# Patient Record
Sex: MALE | URBAN - METROPOLITAN AREA
[De-identification: names, ages, dates, MRNs, and addresses within clinical notes are randomized per-mention and may not be internally consistent; named-entity substitution may affect disease eponyms.]

---

## 2022-06-28 ENCOUNTER — TELEPHONE ENCOUNTER (OUTPATIENT)
Dept: URBAN - METROPOLITAN AREA CLINIC 78 | Facility: CLINIC | Age: 42
End: 2022-06-28

## 2022-06-28 NOTE — PREVIOUS WORKUP REVIEWED
.  ENDOSCOPIES   LABS -Labs 6/16/2022: WBC 3.7, platelet 304, hemoglobin 10.6, hemoglobin A1c 5.4, BUN 13, creatinine 0.96, total bilirubin 0.3, alkaline phosphatase 71, AST 20, ALT 21, total protein 7.1, albumin 4.6.  IMAGES

## 2022-06-28 NOTE — HPI-TODAY'S VISIT:
42-year-old male is self-referred for anemia.  Feeling dizzy intermittently.  He has had rectal bleeding for the past few years.  Blaming hemorrhoids.  Sx of prolapsing. Last EGD and colonoscopy 4 years ago.  Unremarkable.  Hemorrhoids was noted.  Was recommended to do sitz bath as needed.  He plays once every 2-3 months, lasting a few days.  Some lower abdominal cramping with the bleeding otherwise no abdominal pain.  Intentional weight loss of 6 pounds.  Denies nausea vomiting.  Frequent heartburn, treated with over-the-counter PPI as needed only.  Denies constipation.  Moves bowel 1-2 daily.  Denies frequent NSAID use.  No family history of colon cancer or gastric cancer.

## 2022-06-29 ENCOUNTER — DASHBOARD ENCOUNTERS (OUTPATIENT)
Age: 42
End: 2022-06-29

## 2022-06-29 ENCOUNTER — TELEPHONE ENCOUNTER (OUTPATIENT)
Dept: URBAN - METROPOLITAN AREA CLINIC 78 | Facility: CLINIC | Age: 42
End: 2022-06-29

## 2022-07-01 PROBLEM — 724556004: Status: ACTIVE | Noted: 2022-06-29

## 2022-07-06 ENCOUNTER — TELEPHONE ENCOUNTER (OUTPATIENT)
Dept: URBAN - METROPOLITAN AREA SURGERY CENTER 8 | Facility: SURGERY CENTER | Age: 42
End: 2022-07-06

## 2022-07-12 ENCOUNTER — CLAIMS CREATED FROM THE CLAIM WINDOW (OUTPATIENT)
Dept: URBAN - METROPOLITAN AREA CLINIC 4 | Facility: CLINIC | Age: 42
End: 2022-07-12
Payer: SELF-PAY

## 2022-07-12 ENCOUNTER — OFFICE VISIT (OUTPATIENT)
Dept: URBAN - METROPOLITAN AREA SURGERY CENTER 8 | Facility: SURGERY CENTER | Age: 42
End: 2022-07-12
Payer: SELF-PAY

## 2022-07-12 DIAGNOSIS — K31.89 OTHER DISEASES OF STOMACH AND DUODENUM: ICD-10-CM

## 2022-07-12 DIAGNOSIS — D50.9 ANEMIA: ICD-10-CM

## 2022-07-12 DIAGNOSIS — K31.89 ACQUIRED DEFORMITY OF DUODENUM: ICD-10-CM

## 2022-07-12 DIAGNOSIS — K63.89 BACTERIAL OVERGROWTH SYNDROME: ICD-10-CM

## 2022-07-12 DIAGNOSIS — K21.9 ACID REFLUX: ICD-10-CM

## 2022-07-12 DIAGNOSIS — K63.89 OTHER SPECIFIED DISEASES OF INTESTINE: ICD-10-CM

## 2022-07-12 DIAGNOSIS — K21.9 GASTRO-ESOPHAGEAL REFLUX DISEASE WITHOUT ESOPHAGITIS: ICD-10-CM

## 2022-07-12 PROCEDURE — 43239 EGD BIOPSY SINGLE/MULTIPLE: CPT | Performed by: INTERNAL MEDICINE

## 2022-07-12 PROCEDURE — 45378 DIAGNOSTIC COLONOSCOPY: CPT | Performed by: INTERNAL MEDICINE

## 2022-07-12 PROCEDURE — G8907 PT DOC NO EVENTS ON DISCHARG: HCPCS | Performed by: INTERNAL MEDICINE

## 2022-07-12 PROCEDURE — 88342 IMHCHEM/IMCYTCHM 1ST ANTB: CPT | Performed by: PATHOLOGY

## 2022-07-12 PROCEDURE — 88313 SPECIAL STAINS GROUP 2: CPT | Performed by: PATHOLOGY

## 2022-07-12 PROCEDURE — 88305 TISSUE EXAM BY PATHOLOGIST: CPT | Performed by: PATHOLOGY

## 2022-07-12 PROCEDURE — 88312 SPECIAL STAINS GROUP 1: CPT | Performed by: PATHOLOGY

## 2023-03-14 ENCOUNTER — WEB ENCOUNTER (OUTPATIENT)
Age: 43
End: 2023-03-14

## 2023-03-14 RX ORDER — TAMSULOSIN HYDROCHLORIDE 0.4 MG/1
1 CAPSULE CAPSULE ORAL ONCE A DAY
Qty: 90 CAPSULE | Refills: 1 | OUTPATIENT
Start: 2023-03-14

## 2023-08-13 ENCOUNTER — TELEPHONE ENCOUNTER (OUTPATIENT)
Dept: URBAN - METROPOLITAN AREA CLINIC 78 | Facility: CLINIC | Age: 43
End: 2023-08-13

## 2023-08-13 RX ORDER — FINASTERIDE 5 MG/1
1 TABLET TABLET, FILM COATED ORAL ONCE A DAY
Qty: 90 TABLET | Refills: 1 | OUTPATIENT
Start: 2023-08-13 | End: 2024-02-08

## 2023-08-13 RX ORDER — TAMSULOSIN HYDROCHLORIDE 0.4 MG/1
1 CAPSULE CAPSULE ORAL ONCE A DAY
Qty: 90 CAPSULE | Refills: 1
Start: 2023-03-14 | End: 2024-02-09

## 2023-11-23 ENCOUNTER — WEB ENCOUNTER (OUTPATIENT)
Age: 43
End: 2023-11-23

## 2023-11-23 RX ORDER — VALACYCLOVIR HYDROCHLORIDE 1 G/1
1 TABLET TABLET, FILM COATED ORAL THREE TIMES A DAY
Qty: 21 TABLET | Refills: 0 | OUTPATIENT
Start: 2023-11-23

## 2024-06-08 ENCOUNTER — TELEPHONE ENCOUNTER (OUTPATIENT)
Dept: URBAN - METROPOLITAN AREA CLINIC 78 | Facility: CLINIC | Age: 44
End: 2024-06-08

## 2024-06-08 ENCOUNTER — LAB OUTSIDE AN ENCOUNTER (OUTPATIENT)
Dept: URBAN - METROPOLITAN AREA CLINIC 78 | Facility: CLINIC | Age: 44
End: 2024-06-08

## 2024-06-08 RX ORDER — HYDROXYZINE HYDROCHLORIDE 25 MG/1
1 TABLET AS NEEDED TABLET, FILM COATED ORAL AT BEDTIME
Qty: 30 | Refills: 0 | Status: ACTIVE | COMMUNITY
Start: 2024-02-04

## 2024-06-08 RX ORDER — VALACYCLOVIR HYDROCHLORIDE 1 G/1
1 TABLET TABLET, FILM COATED ORAL THREE TIMES A DAY
Qty: 21 TABLET | Refills: 0 | Status: ACTIVE | COMMUNITY
Start: 2023-11-23

## 2024-06-08 NOTE — PHYSICAL EXAM GASTROINTESTINAL
Abdomen,  soft, mild tenderness in the periumbilical area, nondistended,  normal bowel sounds.  Small reducible umbilical hernia.

## 2024-06-08 NOTE — HPI-TODAY'S VISIT:
44-year-old male with right lower quadrant abdominal pain.  He also noted small bulging in the umbilicus. Constant pain.  Waxing waning.  Frequent urination. EGD colonoscopy 2022 unremarkable.

## 2024-06-19 ENCOUNTER — TELEPHONE ENCOUNTER (OUTPATIENT)
Dept: URBAN - METROPOLITAN AREA CLINIC 78 | Facility: CLINIC | Age: 44
End: 2024-06-19

## 2024-06-30 ENCOUNTER — TELEPHONE ENCOUNTER (OUTPATIENT)
Dept: URBAN - METROPOLITAN AREA CLINIC 78 | Facility: CLINIC | Age: 44
End: 2024-06-30

## 2024-06-30 RX ORDER — CIPROFLOXACIN 500 MG/1
1 TABLET TABLET, FILM COATED ORAL TWICE A DAY
Qty: 28 TABLET | Refills: 1 | OUTPATIENT
Start: 2024-06-30 | End: 2024-07-28